# Patient Record
Sex: MALE | URBAN - METROPOLITAN AREA
[De-identification: names, ages, dates, MRNs, and addresses within clinical notes are randomized per-mention and may not be internally consistent; named-entity substitution may affect disease eponyms.]

---

## 2019-02-19 ENCOUNTER — OFFICE VISIT (OUTPATIENT)
Dept: URBAN - METROPOLITAN AREA CLINIC 76 | Facility: CLINIC | Age: 64
End: 2019-02-19

## 2019-02-19 DIAGNOSIS — Z96.1 PRESENCE OF INTRAOCULAR LENS: ICD-10-CM

## 2019-02-19 DIAGNOSIS — H26.492 OTHER SECONDARY CATARACT, LEFT EYE: ICD-10-CM

## 2019-02-19 DIAGNOSIS — H25.11 AGE-RELATED NUCLEAR CATARACT, RIGHT EYE: ICD-10-CM

## 2019-02-19 DIAGNOSIS — H35.3120 NONEXUDATIVE AGE-RELATED MACULAR DEGENERATION OF LEFT EYE: Primary | ICD-10-CM

## 2019-02-19 PROCEDURE — 99204 OFFICE O/P NEW MOD 45 MIN: CPT | Performed by: OPTOMETRIST

## 2019-02-19 ASSESSMENT — INTRAOCULAR PRESSURE
OS: 13
OD: 13

## 2019-02-19 NOTE — IMPRESSION/PLAN
Impression: Other secondary cataract, left eye: H26.492. OS. Plan: Discussed condition. Recommend YAG Consult w/ Dr. Clau Naranjo.

## 2019-02-19 NOTE — IMPRESSION/PLAN
Impression: Non-exudative age-related macular degeneration of left eye: H35.3120. OS. pt is monocular. pt is a smoker. Plan: AMD accounts for patient's complaints. Discussed condition. Strongly emphasized tobacco cessation. [place]msler grid reviewed and given. Discussed AREDS2.

## 2019-03-22 ENCOUNTER — OFFICE VISIT (OUTPATIENT)
Dept: URBAN - METROPOLITAN AREA CLINIC 76 | Facility: CLINIC | Age: 64
End: 2019-03-22

## 2019-03-22 DIAGNOSIS — H43.812 VITREOUS DEGENERATION, LEFT EYE: ICD-10-CM

## 2019-03-22 PROCEDURE — 92014 COMPRE OPH EXAM EST PT 1/>: CPT | Performed by: OPHTHALMOLOGY

## 2019-03-22 ASSESSMENT — INTRAOCULAR PRESSURE
OD: 13
OS: 16

## 2019-03-22 ASSESSMENT — KERATOMETRY
OD: 44.25
OS: 44.13

## 2019-03-22 ASSESSMENT — VISUAL ACUITY: OS: 20/25

## 2019-03-22 NOTE — IMPRESSION/PLAN
Impression: Age-related nuclear cataract, right eye: H25.11. OD. mature. s/p trauma. Plan: Continue to monitor.

## 2019-03-22 NOTE — IMPRESSION/PLAN
Impression: Non-exudative age-related macular degeneration of left eye: H35.3120. OS. pt is monocular. pt is a smoker. Plan: Will continue to observe condition and or symptoms. Use of vitamins may reduce progression of ARMD. Discussed added risk and vision limitations.

## 2019-03-22 NOTE — IMPRESSION/PLAN
Impression: Other secondary cataract, left eye: H26.492 OS. Visually significant Plan: Discussed diagnosis in detail with patient. Recommend Yag OS.  r/b/a of yag cap discussed, pt would like to proceed.

## 2019-04-01 ENCOUNTER — SURGERY (OUTPATIENT)
Dept: URBAN - METROPOLITAN AREA SURGERY 47 | Facility: SURGERY | Age: 64
End: 2019-04-01

## 2019-04-01 PROCEDURE — 66821 AFTER CATARACT LASER SURGERY: CPT | Performed by: OPHTHALMOLOGY

## 2019-04-12 ENCOUNTER — POST-OPERATIVE VISIT (OUTPATIENT)
Dept: URBAN - METROPOLITAN AREA CLINIC 76 | Facility: CLINIC | Age: 64
End: 2019-04-12

## 2019-04-12 DIAGNOSIS — Z09 ENCNTR FOR F/U EXAM AFT TRTMT FOR COND OTH THAN MALIG NEOPLM: Primary | ICD-10-CM

## 2019-04-12 PROCEDURE — 99024 POSTOP FOLLOW-UP VISIT: CPT | Performed by: OPTOMETRIST

## 2019-04-12 ASSESSMENT — INTRAOCULAR PRESSURE
OS: 17
OD: 13

## 2019-04-12 ASSESSMENT — VISUAL ACUITY: OS: 20/30

## 2019-06-11 ENCOUNTER — OFFICE VISIT (OUTPATIENT)
Dept: URBAN - METROPOLITAN AREA CLINIC 76 | Facility: CLINIC | Age: 64
End: 2019-06-11

## 2019-06-11 PROCEDURE — 99024 POSTOP FOLLOW-UP VISIT: CPT | Performed by: OPHTHALMOLOGY

## 2019-06-11 ASSESSMENT — VISUAL ACUITY: OS: 20/40+2

## 2019-06-11 ASSESSMENT — INTRAOCULAR PRESSURE
OD: 16
OS: 15

## 2019-06-11 NOTE — IMPRESSION/PLAN
Impression: Non-exudative age-related macular degeneration of left eye: H35.3120. OS. pt is monocular. pt is a smoker. MAC-OCT done today. Plan: Will continue to observe condition and or symptoms. Use of vitamins may reduce progression of ARMD. Discussed tobacco cessation. Do not recommend AREDS 2 vitamins due to smoking.